# Patient Record
Sex: MALE | Race: WHITE | NOT HISPANIC OR LATINO | Employment: OTHER | URBAN - METROPOLITAN AREA
[De-identification: names, ages, dates, MRNs, and addresses within clinical notes are randomized per-mention and may not be internally consistent; named-entity substitution may affect disease eponyms.]

---

## 2019-02-19 ENCOUNTER — OFFICE VISIT (OUTPATIENT)
Dept: PULMONOLOGY | Facility: CLINIC | Age: 64
End: 2019-02-19
Payer: COMMERCIAL

## 2019-02-19 VITALS
SYSTOLIC BLOOD PRESSURE: 112 MMHG | BODY MASS INDEX: 23.48 KG/M2 | WEIGHT: 164 LBS | HEIGHT: 70 IN | OXYGEN SATURATION: 92 % | DIASTOLIC BLOOD PRESSURE: 70 MMHG | HEART RATE: 84 BPM

## 2019-02-19 DIAGNOSIS — J43.1 PANLOBULAR EMPHYSEMA: Primary | ICD-10-CM

## 2019-02-19 DIAGNOSIS — Z87.891 PERSONAL HISTORY OF TOBACCO USE, PRESENTING HAZARDS TO HEALTH: ICD-10-CM

## 2019-02-19 PROCEDURE — 99204 OFFICE O/P NEW MOD 45 MIN: CPT | Mod: ,,, | Performed by: INTERNAL MEDICINE

## 2019-02-19 PROCEDURE — 99204 PR OFFICE/OUTPT VISIT, NEW, LEVL IV, 45-59 MIN: ICD-10-PCS | Mod: ,,, | Performed by: INTERNAL MEDICINE

## 2019-02-19 RX ORDER — GUAIFENESIN 600 MG/1
1200 TABLET, EXTENDED RELEASE ORAL 2 TIMES DAILY
COMMUNITY

## 2019-02-19 NOTE — PROGRESS NOTES
"New Office Visit/Consultation Note    Patient Name: Wayne Gibbs  MRN: [id number]  : 1955      Reason for visit: COPD    HPI:     2019 - [facility] (no [facility] to [facility]) for COPD.  says he was diagnosed about 10 years ago.  To his knowledge he has not had any recent pulmonary testing.  He does take symbicort as a maintenance medication.  Has trouble with SOB and CALDERON and some cough (mostly nonproductive), no h/o hemoptysis.  [facility].  + smoking about 1 PPD but trying to quit, 1.5 PPD x 50 years.  Has not had a CT chest to his recollection.  Has been on home O2 in past (had episode of aspiration and a prolonged hospital stay (> 3 months).  Had lung surgery for "mold" on his right lung - ? RLL lobectomy.  Also reports pneumonia 5 times in his life    Preoperative Pulmonary Clearance    Pt was seen for preoperative clearance from a pulmonary standpoint for planned ear surgery.  The risks of the procedure have been discussed with the patient including the risk of prolonged ventilatory support/difficulty weaning from ventilator, post-procedure pneumonia, post-procedure respiratory failure and DVT/pulmonary embolism.  The pt is currently stable from their respiratory status and they are cleared for the planned procedure at increased risk.  The risk should not be considered prohibitive.  If you have any questions please contact me.      Past Medical History    Past Medical History:   Diagnosis Date    Bipolar 1 disorder     COPD (chronic obstructive pulmonary disease)     Depression     GERD (gastroesophageal reflux disease)     Hepatitis C        Past Surgical History    Past Surgical History:   Procedure Laterality Date    BACK SURGERY      fusion of c4 and c5    FRACTURE SURGERY      KNEE SURGERY      LUNG SURGERY      SHOULDER SURGERY      TONSILLECTOMY         Medications      Current Outpatient Medications:     ALBUTEROL INHL, Inhale into the lungs., " Disp: , Rfl:     atorvastatin (LIPITOR) 20 MG tablet, Take 1 tablet (20 mg total) by mouth once daily., Disp: 90 tablet, Rfl: 3    buprenorphine-naloxone (SUBOXONE) 8-2 mg Film, Place under the tongue., Disp: , Rfl:     guaiFENesin (MUCINEX) 600 mg 12 hr tablet, Take 1,200 mg by mouth 2 (two) times daily., Disp: , Rfl:     omeprazole (PRILOSEC) 10 MG capsule, Take 2 capsules (20 mg total) by mouth every morning., Disp: 30 capsule, Rfl: 1    tamsulosin (FLOMAX) 0.4 mg Cp24, Take 0.4 mg by mouth once daily., Disp: , Rfl:     Allergies    Review of patient's allergies indicates:   Allergen Reactions    Toradol [ketorolac]      Severe HA         SocHx    Social History     Tobacco Use   Smoking Status Current Some Day Smoker    Packs/day: 1.00    Types: Cigarettes    Last attempt to quit: 7/3/2013    Years since quittin.6   Smokeless Tobacco Never Used       Social History     Substance and Sexual Activity   Alcohol Use Yes    Comment: occ       Drug Use - on suboxone  Occupation - disabled, construction work, artillery in [place]  Asbestos exposure - +  Pets - dog    FMHx    History reviewed. No pertinent family history.      Review of Systems  Review of Systems   Constitutional: Positive for malaise/fatigue. Negative for chills, diaphoresis, fever and weight loss.   HENT: Positive for ear pain and hearing loss. Negative for congestion, ear discharge, nosebleeds, sinus pain, sore throat and tinnitus.         Chronic tubes, needs surgery/clearance   Eyes: Negative for blurred vision, double vision, photophobia, pain, discharge and redness.        Has detached retina   Respiratory: Positive for cough and shortness of breath. Negative for hemoptysis, sputum production, wheezing and stridor.    Cardiovascular: Negative for chest pain, palpitations, orthopnea, claudication, leg swelling and PND.   Gastrointestinal: Positive for abdominal pain, blood in stool and constipation. Negative for diarrhea, heartburn,  "melena, nausea and vomiting.        Sees GI  Hepatitis C - treated   Genitourinary: Negative for dysuria, flank pain, frequency, hematuria and urgency.   Musculoskeletal: Positive for back pain, falls and myalgias. Negative for joint pain and neck pain.        Cramps   Skin: Negative for itching and rash.   Neurological: Positive for dizziness. Negative for tingling, tremors, sensory change, speech change, focal weakness, seizures, loss of consciousness, weakness and headaches.        Poor balance   Endo/Heme/Allergies: Negative for environmental allergies and polydipsia.   Psychiatric/Behavioral: Negative for depression, hallucinations, memory loss, substance abuse and suicidal ideas. The patient has insomnia. The patient is not nervous/anxious.         Bipolar  PTSD  Uses suboxone       Physical Exam    Vitals:    02/19/19 0953   BP: 112/70   Pulse: 84   SpO2: (!) 92%   Weight: 74.4 kg (164 lb)   Height: 5' 10" (1.778 m)       Physical Exam   Constitutional: He is oriented to person, place, and time. He appears well-developed and well-nourished. No distress.   HENT:   Head: Normocephalic and atraumatic.   Nose: Nose normal.   Mouth/Throat: Oropharynx is clear and moist.   Bilateral scarring, + tube left ear, ? Tube vs scarring right  O/P - + clear postnasal drip   Eyes: EOM are normal. Pupils are equal, round, and reactive to light.   Neck: Normal range of motion. Neck supple. No JVD present. No tracheal deviation present. No thyromegaly present.   Cardiovascular: Normal rate, regular rhythm, normal heart sounds and intact distal pulses. Exam reveals no gallop and no friction rub.   No murmur heard.  Pulmonary/Chest: Effort normal. No stridor. No respiratory distress. He has no wheezes. He has rales. He exhibits no tenderness.   fair movement  Few rales left base posteriorly   Abdominal: Soft. Bowel sounds are normal. He exhibits no distension.   Musculoskeletal: Normal range of motion. He exhibits no edema or " tenderness.   Lymphadenopathy:     He has no cervical adenopathy.   Neurological: He is alert and oriented to person, place, and time. He has normal reflexes. No cranial nerve deficit.   Skin: Skin is warm and dry. He is not diaphoretic.   Psychiatric: He has a normal mood and affect. His behavior is normal.   anxious   Nursing note and vitals reviewed.      Labs    Lab Results   Component Value Date    WBC 7.82 10/06/2015    HGB 11.8 (L) 10/06/2015    HCT 36.3 (L) 10/06/2015     10/06/2015       Sodium   Date Value Ref Range Status   10/06/2015 138 136 - 145 mmol/L Final     Potassium   Date Value Ref Range Status   10/06/2015 4.7 3.5 - 5.1 mmol/L Final     Chloride   Date Value Ref Range Status   10/06/2015 103 95 - 110 mmol/L Final     CO2   Date Value Ref Range Status   10/06/2015 27 23 - 29 mmol/L Final     Glucose   Date Value Ref Range Status   10/06/2015 105 70 - 110 mg/dL Final     BUN, Bld   Date Value Ref Range Status   10/06/2015 23 8 - 23 mg/dL Final     Creatinine   Date Value Ref Range Status   10/06/2015 0.7 0.5 - 1.4 mg/dL Final     Calcium   Date Value Ref Range Status   10/06/2015 9.1 8.7 - 10.5 mg/dL Final     Total Protein   Date Value Ref Range Status   10/05/2015 7.4 6.0 - 8.4 g/dL Final     Albumin   Date Value Ref Range Status   10/05/2015 3.7 3.5 - 5.2 g/dL Final     Total Bilirubin   Date Value Ref Range Status   10/05/2015 0.3 0.1 - 1.0 mg/dL Final     Comment:     For infants and newborns, interpretation of results should be based  on gestational age, weight and in agreement with clinical  observations.  Premature Infant recommended reference ranges:  Up to 24 hours.............<8.0 mg/dL  Up to 48 hours............<12.0 mg/dL  3-5 days..................<15.0 mg/dL  6-29 days.................<15.0 mg/dL       Alkaline Phosphatase   Date Value Ref Range Status   10/05/2015 81 55 - 135 U/L Final     AST   Date Value Ref Range Status   10/05/2015 19 10 - 40 U/L Final     ALT   Date  Value Ref Range Status   10/05/2015 18 10 - 44 U/L Final     Anion Gap   Date Value Ref Range Status   10/06/2015 8 8 - 16 mmol/L Final       Xrays        Impression/Plan    Problem List Items Addressed This Visit        Pulmonary    Panlobular emphysema - Primary  - need to evaluate  - continue present meds but may consider change to LABA/LAMA  - recheck O2 needs  - may need overnight study  - will need to clear for planned surgery on ears (once data is back)  - [facility]  - will likely need screening CT chest    Relevant Orders    Complete PFT with bronchodilator    Six Minute Walk Test to qualify for Home Oxygen       Other    Personal history of tobacco use, presenting hazards to health  - needs to stop smoking  - d/w pt          I have spent about 45 minutes with the patient taking the history and examining the patient.  We have discussed the diagnoses and current plan and all questions have been answered.  We have discussed the follow up plan.  The patient and family (if present) know to contact the office with any questions they may have.        Kevin Kelly MD

## 2019-02-27 ENCOUNTER — TELEPHONE (OUTPATIENT)
Dept: PULMONOLOGY | Facility: CLINIC | Age: 64
End: 2019-02-27

## 2023-01-12 ENCOUNTER — TELEPHONE (OUTPATIENT)
Dept: PRIMARY CARE CLINIC | Facility: CLINIC | Age: 68
End: 2023-01-12
Payer: MEDICARE

## 2023-01-13 ENCOUNTER — TELEPHONE (OUTPATIENT)
Dept: PRIMARY CARE CLINIC | Facility: CLINIC | Age: 68
End: 2023-01-13

## 2023-01-13 ENCOUNTER — OFFICE VISIT (OUTPATIENT)
Dept: PRIMARY CARE CLINIC | Facility: CLINIC | Age: 68
End: 2023-01-13
Payer: MEDICARE

## 2023-01-13 VITALS
SYSTOLIC BLOOD PRESSURE: 128 MMHG | TEMPERATURE: 98 F | BODY MASS INDEX: 20.69 KG/M2 | RESPIRATION RATE: 18 BRPM | OXYGEN SATURATION: 95 % | HEART RATE: 79 BPM | WEIGHT: 144.5 LBS | DIASTOLIC BLOOD PRESSURE: 70 MMHG | HEIGHT: 70 IN

## 2023-01-13 DIAGNOSIS — I50.810 CONGESTIVE HEART FAILURE WITH RIGHT HEART FAILURE: ICD-10-CM

## 2023-01-13 DIAGNOSIS — F11.21 OPIOID DEPENDENCE, IN REMISSION: Primary | ICD-10-CM

## 2023-01-13 DIAGNOSIS — B18.2 CHRONIC HEPATITIS C WITHOUT HEPATIC COMA: ICD-10-CM

## 2023-01-13 DIAGNOSIS — R29.6 FALLING EPISODES: ICD-10-CM

## 2023-01-13 DIAGNOSIS — G89.29 CHRONIC BILATERAL LOW BACK PAIN WITHOUT SCIATICA: ICD-10-CM

## 2023-01-13 DIAGNOSIS — I73.9 PERIPHERAL ARTERIAL DISEASE: ICD-10-CM

## 2023-01-13 DIAGNOSIS — F31.9 BIPOLAR 1 DISORDER: ICD-10-CM

## 2023-01-13 DIAGNOSIS — J43.1 PANLOBULAR EMPHYSEMA: ICD-10-CM

## 2023-01-13 DIAGNOSIS — M54.50 CHRONIC BILATERAL LOW BACK PAIN WITHOUT SCIATICA: ICD-10-CM

## 2023-01-13 PROCEDURE — 3078F PR MOST RECENT DIASTOLIC BLOOD PRESSURE < 80 MM HG: ICD-10-PCS | Mod: CPTII,S$GLB,, | Performed by: INTERNAL MEDICINE

## 2023-01-13 PROCEDURE — 1159F MED LIST DOCD IN RCRD: CPT | Mod: CPTII,S$GLB,, | Performed by: INTERNAL MEDICINE

## 2023-01-13 PROCEDURE — 3078F DIAST BP <80 MM HG: CPT | Mod: CPTII,S$GLB,, | Performed by: INTERNAL MEDICINE

## 2023-01-13 PROCEDURE — 1159F PR MEDICATION LIST DOCUMENTED IN MEDICAL RECORD: ICD-10-PCS | Mod: CPTII,S$GLB,, | Performed by: INTERNAL MEDICINE

## 2023-01-13 PROCEDURE — 3074F PR MOST RECENT SYSTOLIC BLOOD PRESSURE < 130 MM HG: ICD-10-PCS | Mod: CPTII,S$GLB,, | Performed by: INTERNAL MEDICINE

## 2023-01-13 PROCEDURE — 99215 OFFICE O/P EST HI 40 MIN: CPT | Mod: GW,S$GLB,, | Performed by: INTERNAL MEDICINE

## 2023-01-13 PROCEDURE — 1160F RVW MEDS BY RX/DR IN RCRD: CPT | Mod: CPTII,S$GLB,, | Performed by: INTERNAL MEDICINE

## 2023-01-13 PROCEDURE — 3074F SYST BP LT 130 MM HG: CPT | Mod: CPTII,S$GLB,, | Performed by: INTERNAL MEDICINE

## 2023-01-13 PROCEDURE — 3008F BODY MASS INDEX DOCD: CPT | Mod: CPTII,S$GLB,, | Performed by: INTERNAL MEDICINE

## 2023-01-13 PROCEDURE — 1160F PR REVIEW ALL MEDS BY PRESCRIBER/CLIN PHARMACIST DOCUMENTED: ICD-10-PCS | Mod: CPTII,S$GLB,, | Performed by: INTERNAL MEDICINE

## 2023-01-13 PROCEDURE — 3008F PR BODY MASS INDEX (BMI) DOCUMENTED: ICD-10-PCS | Mod: CPTII,S$GLB,, | Performed by: INTERNAL MEDICINE

## 2023-01-13 PROCEDURE — 99215 PR OFFICE/OUTPT VISIT, EST, LEVL V, 40-54 MIN: ICD-10-PCS | Mod: GW,S$GLB,, | Performed by: INTERNAL MEDICINE

## 2023-01-13 RX ORDER — ALBUTEROL SULFATE 90 UG/1
AEROSOL, METERED RESPIRATORY (INHALATION)
COMMUNITY
Start: 2022-05-13

## 2023-01-13 RX ORDER — ASPIRIN 81 MG/1
81 TABLET ORAL
COMMUNITY

## 2023-01-13 RX ORDER — GABAPENTIN 100 MG/1
100 CAPSULE ORAL
COMMUNITY
Start: 2022-10-24 | End: 2023-04-22

## 2023-01-13 RX ORDER — MULTIVIT WITH IRON,MINERALS
1 TABLET ORAL DAILY
COMMUNITY

## 2023-01-13 RX ORDER — DOCUSATE SODIUM 100 MG/1
100 CAPSULE, LIQUID FILLED ORAL 2 TIMES DAILY
COMMUNITY

## 2023-01-13 NOTE — ASSESSMENT & PLAN NOTE
Refer to spine surgeon, neurology  Falling precautions d/w pt/family  Add hand rails , use walker, t/c a PMD

## 2023-01-13 NOTE — TELEPHONE ENCOUNTER
----- Message from Neel Khan MD sent at 1/12/2023  4:38 PM CST -----  Regarding: Falling  Contact: Sister  Patient is being seen by hospice and he has nurses come to his house very often think the 2nd of February before his problem has been chronic and has not changed much recently and adjust discuss his case at the meeting yesterday but please schedule him for an appointment sooner if possible thank you  ----- Message -----  From: Dora Hamilton MA  Sent: 1/12/2023   1:06 PM CST  To: Neel Khan MD      ----- Message -----  From: Lesvia Duval  Sent: 1/12/2023  10:42 AM CST  To: [facility] Staff    Type:  Sooner Apoointment Request    .  Name of Caller:    When is the first available appointment? 2/2/23    Symptoms:Dizzy spells and falling     Would the patient rather a call back or a response via [facility]? Call    Best Call Back Number:[phone] (home)     Additional Information: Patient is in need of a sooner appt to attempt to find the reason for the dizzy spells and falls. Sister is fearful that he may fall and break something or become seriously injured. Please call to advise     Sister is also requesting that Xrays be done to be sure he hasn't already been seriously injured.

## 2023-01-13 NOTE — PROGRESS NOTES
Subjective:       Patient ID: Wayne Gibbs is a 67 y.o. male.    Chief Complaint: Establish Care, Fall (Recent falls 4 in 1 week), and Back Pain (From fall 3 days ago)      Low Back Pain: Patient complains of chronic low back pain. This is evaluated as a personal injury. The patient first noted symptoms severalyears ago. It was related to a fall. The pain is rated severe, and is located at the across the lower back. The pain is described as sharp and occurs intermittently. The symptoms admits to been progressive. Symptoms are exacerbated by standing. Factors which relieve the pain include narcotic pain medications. Other associated symptoms include weakness in the right leg and weakness in the left leg. Previous history of symptoms: the problem is long-standing.   Treatment efforts have included rest, prescription NSAIDS, and muscle relaxers, with and without relief.        Fall  The accident occurred 2 days ago. The fall occurred while standing. He fell from a height of 3 to 5 ft. He landed on Carpet. The point of impact was the buttocks. The pain is present in the back. The pain is at a severity of 5/10. The pain is moderate. The symptoms are aggravated by pressure on injury and standing. Pertinent negatives include no fever. He has tried acetaminophen, heat, immobilization, NSAID and rest for the symptoms. The treatment provided mild relief.   Back Pain  Associated symptoms include weakness. Pertinent negatives include no fever.   Review of Systems   Constitutional:  Negative for activity change, appetite change and fever.   Respiratory: Negative.     Cardiovascular: Negative.    Gastrointestinal: Negative.    Musculoskeletal:  Positive for back pain.   Neurological:  Positive for dizziness, weakness, light-headedness, coordination difficulties and coordination difficulties.       Objective:      Physical Exam  Vitals and nursing note reviewed.   Constitutional:       Appearance: Normal appearance.       Comments: Ill looking frail white male   Cardiovascular:      Rate and Rhythm: Normal rate and regular rhythm.      Pulses: Normal pulses.      Heart sounds: Normal heart sounds. No murmur heard.    No friction rub. No gallop.   Pulmonary:      Effort: Pulmonary effort is normal.      Breath sounds: Normal breath sounds. No wheezing.   Abdominal:      General: Abdomen is flat. Bowel sounds are normal.      Palpations: Abdomen is soft.   Musculoskeletal:         General: Normal range of motion.   Skin:     General: Skin is warm and dry.      Comments: Old surgical scal of lower mid back   Neurological:      General: No focal deficit present.      Mental Status: He is alert and oriented to person, place, and time.      Comments: Using a cane for support   Psychiatric:         Mood and Affect: Mood normal.       Assessment:       1. Opioid dependence, in remission    2. Falling episodes  Overview:  Recurrent falls    Assessment & Plan:  Sec to chronic back problems, r/o Parkinsons, MS , other Neurological problems , spine cord dis      3. Congestive heart failure with right heart failure    4. Peripheral arterial disease    5. Chronic hepatitis C without hepatic coma    6. Bipolar 1 disorder    7. Panlobular emphysema    8. Chronic bilateral low back pain without sciatica  Overview:  Problem List:  2023-01: Falling episodes  2023-01: Opioid dependence, in remission  2023-01: Congestive heart failure with right heart failure  2023-01: Peripheral arterial disease  2019-02: Personal history of tobacco use, presenting hazards to health  2015-10: Panlobular emphysema  2015-10: Bipolar 1 disorder  2015-10: Hepatitis C, chronic  2015-10: Pain in joint involving left upper arm  2015-10: Constipation due to opioid therapy  2015-10: Arthralgia of left acromioclavicular joint  2015-10: Acute chest pain        Assessment & Plan:  Refer to spine surgeon, neurology  Falling precautions d/w pt/family  Add hand rails , use walker, t/c  a PMD               Plan:       1. Opioid dependence, in remission    2. Falling episodes  Overview:  Recurrent falls    Assessment & Plan:  Sec to chronic back problems, r/o Parkinsons, MS , other Neurological problems , spine cord dis      3. Congestive heart failure with right heart failure    4. Peripheral arterial disease    5. Chronic hepatitis C without hepatic coma    6. Bipolar 1 disorder    7. Panlobular emphysema    8. Chronic bilateral low back pain without sciatica  Overview:  Problem List:  2023-01: Falling episodes  2023-01: Opioid dependence, in remission  2023-01: Congestive heart failure with right heart failure  2023-01: Peripheral arterial disease  2019-02: Personal history of tobacco use, presenting hazards to health  2015-10: Panlobular emphysema  2015-10: Bipolar 1 disorder  2015-10: Hepatitis C, chronic  2015-10: Pain in joint involving left upper arm  2015-10: Constipation due to opioid therapy  2015-10: Arthralgia of left acromioclavicular joint  2015-10: Acute chest pain        Assessment & Plan:  Refer to spine surgeon, neurology  Falling precautions d/w pt/family  Add hand rails , use walker, t/c a PMD

## 2023-02-17 ENCOUNTER — TELEPHONE (OUTPATIENT)
Dept: PRIMARY CARE CLINIC | Facility: CLINIC | Age: 68
End: 2023-02-17
Payer: MEDICARE